# Patient Record
Sex: FEMALE | Race: WHITE | NOT HISPANIC OR LATINO | URBAN - METROPOLITAN AREA
[De-identification: names, ages, dates, MRNs, and addresses within clinical notes are randomized per-mention and may not be internally consistent; named-entity substitution may affect disease eponyms.]

---

## 2024-01-25 ENCOUNTER — OUTPATIENT (OUTPATIENT)
Dept: OUTPATIENT SERVICES | Facility: HOSPITAL | Age: 44
LOS: 1 days | End: 2024-01-25
Payer: COMMERCIAL

## 2024-01-25 DIAGNOSIS — R10.2 PELVIC AND PERINEAL PAIN: ICD-10-CM

## 2024-01-25 DIAGNOSIS — Z00.8 ENCOUNTER FOR OTHER GENERAL EXAMINATION: ICD-10-CM

## 2024-01-25 PROCEDURE — 76700 US EXAM ABDOM COMPLETE: CPT

## 2024-01-25 PROCEDURE — 76700 US EXAM ABDOM COMPLETE: CPT | Mod: 26

## 2024-01-26 DIAGNOSIS — R10.2 PELVIC AND PERINEAL PAIN: ICD-10-CM

## 2024-05-24 ENCOUNTER — EMERGENCY (EMERGENCY)
Facility: HOSPITAL | Age: 44
LOS: 0 days | Discharge: HOME | End: 2024-05-24
Attending: EMERGENCY MEDICINE
Payer: COMMERCIAL

## 2024-05-24 VITALS
HEART RATE: 100 BPM | HEIGHT: 62 IN | DIASTOLIC BLOOD PRESSURE: 102 MMHG | WEIGHT: 263.89 LBS | TEMPERATURE: 98 F | SYSTOLIC BLOOD PRESSURE: 158 MMHG | OXYGEN SATURATION: 98 % | RESPIRATION RATE: 18 BRPM

## 2024-05-24 DIAGNOSIS — R05.9 COUGH, UNSPECIFIED: ICD-10-CM

## 2024-05-24 DIAGNOSIS — R06.02 SHORTNESS OF BREATH: ICD-10-CM

## 2024-05-24 DIAGNOSIS — J02.9 ACUTE PHARYNGITIS, UNSPECIFIED: ICD-10-CM

## 2024-05-24 LAB
ALBUMIN SERPL ELPH-MCNC: 4.2 G/DL — SIGNIFICANT CHANGE UP (ref 3.5–5.2)
ALP SERPL-CCNC: 86 U/L — SIGNIFICANT CHANGE UP (ref 30–115)
ALT FLD-CCNC: 17 U/L — SIGNIFICANT CHANGE UP (ref 0–41)
ANION GAP SERPL CALC-SCNC: 11 MMOL/L — SIGNIFICANT CHANGE UP (ref 7–14)
AST SERPL-CCNC: 17 U/L — SIGNIFICANT CHANGE UP (ref 0–41)
BASOPHILS # BLD AUTO: 0.08 K/UL — SIGNIFICANT CHANGE UP (ref 0–0.2)
BASOPHILS NFR BLD AUTO: 0.8 % — SIGNIFICANT CHANGE UP (ref 0–1)
BILIRUB SERPL-MCNC: 0.3 MG/DL — SIGNIFICANT CHANGE UP (ref 0.2–1.2)
BUN SERPL-MCNC: 9 MG/DL — LOW (ref 10–20)
CALCIUM SERPL-MCNC: 9.2 MG/DL — SIGNIFICANT CHANGE UP (ref 8.4–10.5)
CHLORIDE SERPL-SCNC: 101 MMOL/L — SIGNIFICANT CHANGE UP (ref 98–110)
CO2 SERPL-SCNC: 24 MMOL/L — SIGNIFICANT CHANGE UP (ref 17–32)
CREAT SERPL-MCNC: 0.7 MG/DL — SIGNIFICANT CHANGE UP (ref 0.7–1.5)
EGFR: 111 ML/MIN/1.73M2 — SIGNIFICANT CHANGE UP
EOSINOPHIL # BLD AUTO: 0.11 K/UL — SIGNIFICANT CHANGE UP (ref 0–0.7)
EOSINOPHIL NFR BLD AUTO: 1.1 % — SIGNIFICANT CHANGE UP (ref 0–8)
GLUCOSE SERPL-MCNC: 115 MG/DL — HIGH (ref 70–99)
HCT VFR BLD CALC: 34.9 % — LOW (ref 37–47)
HGB BLD-MCNC: 11.4 G/DL — LOW (ref 12–16)
IMM GRANULOCYTES NFR BLD AUTO: 0.2 % — SIGNIFICANT CHANGE UP (ref 0.1–0.3)
LIDOCAIN IGE QN: 14 U/L — SIGNIFICANT CHANGE UP (ref 7–60)
LYMPHOCYTES # BLD AUTO: 2.57 K/UL — SIGNIFICANT CHANGE UP (ref 1.2–3.4)
LYMPHOCYTES # BLD AUTO: 25.3 % — SIGNIFICANT CHANGE UP (ref 20.5–51.1)
MCHC RBC-ENTMCNC: 25.9 PG — LOW (ref 27–31)
MCHC RBC-ENTMCNC: 32.7 G/DL — SIGNIFICANT CHANGE UP (ref 32–37)
MCV RBC AUTO: 79.1 FL — LOW (ref 81–99)
MONOCYTES # BLD AUTO: 0.47 K/UL — SIGNIFICANT CHANGE UP (ref 0.1–0.6)
MONOCYTES NFR BLD AUTO: 4.6 % — SIGNIFICANT CHANGE UP (ref 1.7–9.3)
NEUTROPHILS # BLD AUTO: 6.89 K/UL — HIGH (ref 1.4–6.5)
NEUTROPHILS NFR BLD AUTO: 68 % — SIGNIFICANT CHANGE UP (ref 42.2–75.2)
NRBC # BLD: 0 /100 WBCS — SIGNIFICANT CHANGE UP (ref 0–0)
NT-PROBNP SERPL-SCNC: 44 PG/ML — SIGNIFICANT CHANGE UP (ref 0–300)
PLATELET # BLD AUTO: 363 K/UL — SIGNIFICANT CHANGE UP (ref 130–400)
PMV BLD: 9.7 FL — SIGNIFICANT CHANGE UP (ref 7.4–10.4)
POTASSIUM SERPL-MCNC: 4.8 MMOL/L — SIGNIFICANT CHANGE UP (ref 3.5–5)
POTASSIUM SERPL-SCNC: 4.8 MMOL/L — SIGNIFICANT CHANGE UP (ref 3.5–5)
PROT SERPL-MCNC: 7.1 G/DL — SIGNIFICANT CHANGE UP (ref 6–8)
RBC # BLD: 4.41 M/UL — SIGNIFICANT CHANGE UP (ref 4.2–5.4)
RBC # FLD: 14.2 % — SIGNIFICANT CHANGE UP (ref 11.5–14.5)
SODIUM SERPL-SCNC: 136 MMOL/L — SIGNIFICANT CHANGE UP (ref 135–146)
TROPONIN T, HIGH SENSITIVITY RESULT: <6 NG/L — SIGNIFICANT CHANGE UP (ref 6–13)
WBC # BLD: 10.14 K/UL — SIGNIFICANT CHANGE UP (ref 4.8–10.8)
WBC # FLD AUTO: 10.14 K/UL — SIGNIFICANT CHANGE UP (ref 4.8–10.8)

## 2024-05-24 PROCEDURE — 85025 COMPLETE CBC W/AUTO DIFF WBC: CPT

## 2024-05-24 PROCEDURE — 83690 ASSAY OF LIPASE: CPT

## 2024-05-24 PROCEDURE — 71275 CT ANGIOGRAPHY CHEST: CPT | Mod: MC

## 2024-05-24 PROCEDURE — 36000 PLACE NEEDLE IN VEIN: CPT | Mod: XU

## 2024-05-24 PROCEDURE — 80053 COMPREHEN METABOLIC PANEL: CPT

## 2024-05-24 PROCEDURE — 99285 EMERGENCY DEPT VISIT HI MDM: CPT

## 2024-05-24 PROCEDURE — 93010 ELECTROCARDIOGRAM REPORT: CPT

## 2024-05-24 PROCEDURE — 99285 EMERGENCY DEPT VISIT HI MDM: CPT | Mod: 25

## 2024-05-24 PROCEDURE — 84484 ASSAY OF TROPONIN QUANT: CPT

## 2024-05-24 PROCEDURE — 71275 CT ANGIOGRAPHY CHEST: CPT | Mod: 26,MC

## 2024-05-24 PROCEDURE — 93005 ELECTROCARDIOGRAM TRACING: CPT

## 2024-05-24 PROCEDURE — 36415 COLL VENOUS BLD VENIPUNCTURE: CPT

## 2024-05-24 PROCEDURE — 83880 ASSAY OF NATRIURETIC PEPTIDE: CPT

## 2024-05-24 NOTE — ED PROVIDER NOTE - CARE PROVIDER_API CALL
Carroll Souza  Pulmonary Disease  [address] [place] [id number]  Phone: [phone]  Fax: [phone]  Follow Up Time: 7-10 Days

## 2024-05-24 NOTE — ED PROVIDER NOTE - NSFOLLOWUPINSTRUCTIONS_ED_ALL_ED_FT
Our Emergency Department Referral Coordinators will be reaching out to you in the next 24-48 hours from 9:00am to 5:00pm with a follow up appointment. Please expect a phone call from the hospital in that time frame. If you do not receive a call or if you have any questions or concerns, you can reach them at   [phone].     Shortness of breath    Shortness of breath (dyspnea) means you have trouble breathing and could indicate a medical problem. Causes include lung disease, heart disease, low amount of red blood cells (anemia), poor physical fitness, being overweight, smoking, etc. Your health care provider today may not be able to find a cause for your shortness of breath after your exam. In this case, it is important to have a follow-up exam with your primary care physician as instructed. If medicines were prescribed, take them as directed for the full length of time directed. Refrain from tobacco products.    SEEK IMMEDIATE MEDICAL CARE IF YOU HAVE ANY OF THE FOLLOWING SYMPTOMS: worsening shortness of breath, chest pain, back pain, abdominal pain, fever, coughing up blood, lightheadedness/dizziness.

## 2024-05-24 NOTE — ED PROVIDER NOTE - OBJECTIVE STATEMENT
44 yo female with no pertinent pmh presents c/o SOB for 3 weeks. pt states to have noted the SOB after traveling back from [place]. pt denies any other symptoms including fevers, chill, headache, recent illness/travel, cough, abdominal pain, chest pain.

## 2024-05-24 NOTE — ED PROVIDER NOTE - PATIENT PORTAL LINK FT
You can access the FollowMyHealth Patient Portal offered by [facility] by registering at the following website: [email or web address]. By joining [employer]’s FollowMyHealth portal, you will also be able to view your health information using other applications (apps) compatible with our system.

## 2024-05-24 NOTE — ED PROVIDER NOTE - CLINICAL SUMMARY MEDICAL DECISION MAKING FREE TEXT BOX
[facility]  [facility] [address] [place] [address]    Yessenia Jacinto : 1976 MRN: [id number]    2020    Time Session Began: 1:40 PM  Time Session Ended: 2:15 PM    Session Type: 10469 (21-30 minutes)    Telephone Assessment and management services used related to COVID-19 precautions currently in place     Verified patient identity:  [x] Yes    Patient location: Home.     Intervention: Behavioral, Cognitive, Insight, Supportive    Suicide/Homicide/Violence Ideation: No  If Yes, explain: Client denied.     Mental Status Exam: [] Improved  [x] Same  [] Regressed    Current Outpatient Medications   Medication Sig   • rizatriptan (MAXALT) 10 MG tablet TAKE ONE TABLET BY MOUTH AT ONSET OF MIGRAINE. IF SYMPTOMS PERSIST, A SECOND DOSE MAY BE TAKEN IN 2 HOURS ID NEEDED.   • escitalopram (LEXAPRO) 20 MG tablet Take 1 tablet by mouth daily.   • clonazePAM (KLONOPIN) 0.5 MG tablet Take 1 tablet by mouth 2 times daily as needed for Anxiety.   • lamoTRIgine (LAMICTAL) 150 MG tablet Take 1 tablet by mouth daily.   • traZODone (DESYREL) 100 MG tablet Take 1 tablet by mouth at bedtime as needed for Sleep.   • amitriptyline (ELAVIL) 100 MG tablet Take 1 tablet by mouth nightly. Indications: Depression   • hydroCORTisone (CORTIZONE) 2.5 % cream Apply to affected areas under breasts, abdomen and groin twice daily as needed for flares.   • ketoconazole (NIZORAL) 2 % cream Apply to affected areas under breasts, abdomen, feet and groin twice daily as needed for flares.   • doxycycline hyclate (VIBRAMYCIN) 100 MG capsule Take 1 capsule by mouth 2 times daily.   • diclofenac (VOLTAREN) 75 MG EC tablet Take 1 tablet by mouth 2 times daily.   • lidocaine (XYLOCAINE) 5 % ointment Apply 4-6 inches topically to the affected area daily for 12 hours.   • omeprazole (PRILOSEC) 40 MG capsule Take 1 capsule by mouth daily.   • galcanezumab-gnlm (EMGALITY) 120 MG/ML  injection Inject 120 mg into the skin every 30 days.   • diclofenac (VOLTAREN) 1 % gel Apply 2-4 gm topically to the affected area up to 4 times daily   • lisinopril (PRINIVIL,ZESTRIL) 40 MG tablet Take 1 tablet by mouth daily.   • amLODIPine (NORVASC) 10 MG tablet TAKE ONE TABLET BY MOUTH ONCE DAILY   • hydrochlorothiazide (HYDRODIURIL) 25 MG tablet TAKE ONE TABLET BY MOUTH ONCE DAILY   • topiramate (TOPAMAX) 100 MG tablet Take 1 tablet by mouth 2 times daily.   • albuterol 108 (90 Base) MCG/ACT inhaler INHALE TWO PUFFS BY MOUTH EVERY 4 HOURS AS NEEDED FOR SHORTNESS OF BREATH OR  WHEEZING   • metFORMIN (GLUCOPHAGE) 850 MG tablet TAKE 1 TABLET BY MOUTH TWICE DAILY WITH MEALS   • PROAIR  (90 Base) MCG/ACT inhaler INHALE TWO PUFFS BY MOUTH EVERY 4 HOURS AS NEEDED FOR SHORTNESS OF BREATH OR  WHEEZING   • fludrocortisone (FLORINEF) 0.1 MG tablet Take 1 tablet by mouth daily.   • terbinafine (LAMISIL AT) 1 % cream Apply topically 2 times daily.   • atorvastatin (LIPITOR) 20 MG tablet Take 1 tablet by mouth daily.   • mometasone (ASMANEX 60 METERED DOSES) 220 MCG/INH inhaler Inhale 1 puff into the lungs 2 times daily.     No current facility-administered medications for this visit.        Change in Medication(s) Reported: No    If Yes, explain: Not applicable.     Patient/Family Education Provided: Yes    Patient/Family Displays Understanding: Yes  If No, explain: Not applicable.     Chief complaint in patient's own words: “I'm doing okay.”    DARP:     D: The client presents on this date for follow-up for depression, trauma, and anxiety. Client discusses her experience related to COVID-19. She reports \"doing pretty okay\" and has been able to stay somewhat busy in her home, doing projects around her home in her kid's rooms. Reports some increased headaches lately along with decreased sleep due to \"being bored a lot.\"   A: Writer provided supportive psychotherapy. Explored her experience over the past month. Focused  on ways of creating more structure during her day to assist with sleeping. Encouraged walks as able or just getting outside in her backyard when mary kate and nicer.    R: Client is an active participant in session. Affect is calm to neutral with congruent mood. Remains motivated for therapy and receptive to feedback.   P: Follow-up in 3.5 weeks via phone at client's request. Client encouraged to contact if needed prior to next session. Focus on goals as discussed.    Need for Community Resources Assessed: Yes  Resources Needed: Yes  If Yes, what resources: Not applicable.     Primary Diagnosis: MDD (major depressive disorder), recurrent episode, mild.   JAIRO (generalized anxiety disorder).  Trauma and stressor-related disorder.    Treatment Plan: Unchanged    Discharge Plan: Strategies Discussed to Maintain Gains, Continue with Psychiatric APNP.     Next Appointment: May 12, 2020 (via phone)  Joni Dunlap LCSW             ROTH - neg ekg/trop/CT PE - dc home

## 2024-05-24 NOTE — ED PROVIDER NOTE - ATTENDING APP SHARED VISIT CONTRIBUTION OF CARE
Patient is a 43-year-old female who flew to [facility] for a  and returned on May 4.  While she was there she had a sore throat and dry cough.  She lost her voice and that returned but since then has had dyspnea on exertion.  Denies any chest pain or syncope.  No lower extremity edema.  Went to a new PMD today who advised her to come to the ED to rule out a PE.    Exam: Regular rate, lungs clear, no JVD or lower extremity edema, no acute distress  Plan: Labs, CT PE    Number of diagnoses or management options:  [x] Referral or consultation made: pulmonary    Complexity of data reviewed:  [x] Decision made to obtain old record(s) or additional history from the family, caretaker, or other source  [x] Laboratory test(s) ordered and/or reviewed  [ ] Independent interpretation of EKG by Dr. Avinash Presley:  [ ] Independent interpretation of Radiology by Dr. Avinash Presley:   [ ] I, Avinash Presley, had a discussion with    Risk (Management Options):  [ ] High: emergency surgery; monitored drug therapy; controlled parenteral therapy; decision for DNR Patient is a 43-year-old female who flew to [facility] for a  and returned on May 4.  While she was there she had a sore throat and dry cough.  She lost her voice and that returned but since then has had dyspnea on exertion.  Denies any chest pain or syncope.  No lower extremity edema.  Went to a new PMD today who advised her to come to the ED to rule out a PE.    Exam: Regular rate, lungs clear, no JVD or lower extremity edema, no acute distress  Plan: Labs, CT PE    Number of diagnoses or management options:  [x] Referral or consultation made: pulmonary    Complexity of data reviewed:  [x] Decision made to obtain old record(s) or additional history from the family, caretaker, or other source  [x] Laboratory test(s) ordered and/or reviewed  [x] Independent interpretation of EKG by Dr. Avinash Presley: NSR @ 82  [ ] Independent interpretation of Radiology by Dr. Avinash Presley:   [ ] I, Avinash Presley, had a discussion with    Risk (Management Options):  [ ] High: emergency surgery; monitored drug therapy; controlled parenteral therapy; decision for DNR
